# Patient Record
Sex: FEMALE | URBAN - METROPOLITAN AREA
[De-identification: names, ages, dates, MRNs, and addresses within clinical notes are randomized per-mention and may not be internally consistent; named-entity substitution may affect disease eponyms.]

---

## 2017-12-27 ENCOUNTER — TRANSFERRED RECORDS (OUTPATIENT)
Dept: HEALTH INFORMATION MANAGEMENT | Facility: CLINIC | Age: 82
End: 2017-12-27

## 2018-04-19 NOTE — TELEPHONE ENCOUNTER
FUTURE VISIT INFORMATION      FUTURE VISIT INFORMATION:    Date: 04/24/2018    Time: 10:00    Location: [facility]  REFERRAL INFORMATION:    Referring provider:  DR ISRAEL    Referring providers clinic:  [facility]    Reason for visit/diagnosis  chronic cough     RECORDS REQUESTED FROM:       Clinic name Comments Records Status Imaging Status   [facility] OFFICE VISIT: 08/22/2017-02/13/2018  IMAGE XR CHEST 08/22/2017  FLEXIBLE LARYNGOSCOPY 12/18/2017  XR VIDEO SWALLOW 12/27/2017 EXTERNAL YES IN PACS   CARE EVERYWHERE -[facility] 07/14/2016,07/13/2016,06/21/2016  ESOPHAGOGASTRODUODENOSCOPY WITH BIOPSY 06/30/2016 EXTERNAL                              RECORDS STATUS

## 2018-04-24 ENCOUNTER — OFFICE VISIT (OUTPATIENT)
Dept: OTOLARYNGOLOGY | Facility: CLINIC | Age: 83
End: 2018-04-24
Payer: COMMERCIAL

## 2018-04-24 ENCOUNTER — PRE VISIT (OUTPATIENT)
Dept: OTOLARYNGOLOGY | Facility: CLINIC | Age: 83
End: 2018-04-24

## 2018-04-24 VITALS — HEART RATE: 65 BPM | DIASTOLIC BLOOD PRESSURE: 80 MMHG | SYSTOLIC BLOOD PRESSURE: 121 MMHG | OXYGEN SATURATION: 97 %

## 2018-04-24 DIAGNOSIS — R05.9 COUGH: Primary | ICD-10-CM

## 2018-04-24 DIAGNOSIS — R49.0 DYSPHONIA: Primary | ICD-10-CM

## 2018-04-24 DIAGNOSIS — R05.3 CHRONIC COUGH: ICD-10-CM

## 2018-04-24 DIAGNOSIS — J38.3 VOCAL CORD BOWING: ICD-10-CM

## 2018-04-24 DIAGNOSIS — R49.0 DYSPHONIA: ICD-10-CM

## 2018-04-24 RX ORDER — ATORVASTATIN CALCIUM 10 MG/1
10 TABLET, FILM COATED ORAL
COMMUNITY
Start: 2018-01-02

## 2018-04-24 RX ORDER — ALBUTEROL SULFATE 0.83 MG/ML
2.5 SOLUTION RESPIRATORY (INHALATION)
COMMUNITY
Start: 2017-11-30

## 2018-04-24 RX ORDER — LOSARTAN POTASSIUM 50 MG/1
50 TABLET ORAL
COMMUNITY
Start: 2018-01-02

## 2018-04-24 RX ORDER — AZELASTINE 1 MG/ML
1 SPRAY, METERED NASAL
COMMUNITY
Start: 2018-03-01

## 2018-04-24 RX ORDER — MIRTAZAPINE 15 MG/1
15 TABLET, FILM COATED ORAL
COMMUNITY
Start: 2018-01-02

## 2018-04-24 RX ORDER — AMLODIPINE BESYLATE 5 MG/1
5 TABLET ORAL
COMMUNITY
Start: 2018-03-01

## 2018-04-24 RX ORDER — PREDNISONE 5 MG/1
5 TABLET ORAL
COMMUNITY
Start: 2018-04-06

## 2018-04-24 RX ORDER — ACETAMINOPHEN 500 MG
1000 TABLET ORAL
COMMUNITY

## 2018-04-24 RX ORDER — ASPIRIN 81 MG/1
81 TABLET, CHEWABLE ORAL
COMMUNITY
Start: 2018-01-02

## 2018-04-24 RX ORDER — METOPROLOL TARTRATE 100 MG
50 TABLET ORAL
COMMUNITY
Start: 2018-03-01

## 2018-04-24 RX ORDER — SENNOSIDES 8.6 MG
1300 CAPSULE ORAL
COMMUNITY
Start: 2017-12-08

## 2018-04-24 ASSESSMENT — PAIN SCALES - GENERAL: PAINLEVEL: NO PAIN (0)

## 2018-04-24 NOTE — PROGRESS NOTES
[facility]  Evaluation report    Clinician: Zachery Wolff M.M., M.A., CCC/SLP  Seen in conjunction with: Dr. Rudd  Referring physician:  Dr. Venegas  Patient: Angie Campos  Date of Visit: 4/24/2018    HISTORY  Chief complaint: Angie Campos is a 83 year old woman presenting today for evaluation of cough, breathing issues, and throat discomfort.    Onset: Cough began gradually approximately 15 years ago  Inciting incident: Unknown  Course: Stable  Salient history: She has a history significant for hemorrhagic stroke, tremor, and long-standing cough.  She has had an extensive workup in the past including chest CT, bronchoscopies, blood test for food allergens (positive only to milk protein), and most recently nasal endoscopy performed by Dr. Joshua Venegas at [facility].  When seen by Dr. Sky in 2016 he felt that the cough sounded as though it should be productive, but was ineffective, and felt that it could be part of an infectious process, though subsequent labs were indicated normal levels of IgE.  With Dr. 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     OTHER PERTINENT HISTORY    Otherwise unknown.  Please also refer to Dr. Rudd's dictation.     No past medical history on file.  No past surgical history on file.    OBJECTIVE  PATIENT REPORTED MEASURES  Patient Supplied Answers To VHI Questionnaire  Voice Handicap Index (VHI-10) 4/24/2018   My voice makes it difficult for people to hear me 2   People have difficulty understanding me in a noisy room 2   My voice difficulties restrict my personal and social life.  1   I feel left out of conversations because of my voice 0   My voice problem causes me to lose income 0   I feel as though I have to strain to produce voice 0   The clarity of my voice is unpredictable 2   My voice problem upsets me 0   My voice makes me feel handicapped 0   People ask, \"What's wrong with your voice?\" 0   VHI-10 7     Patient Supplied Answers To CSI Questionnaire  Cough Severity Index (CSI) 4/24/2018   My cough is worse when I lie down 0   My coughing problem causes me to restrict my personal and social life 2   I tend to avoid places because of my cough problem 2   I feel embarrassed because of my coughing problem 0   People ask, ''What's wrong?'' because I cough a lot 0   I run out of air when I cough 3   My coughing problem affects my voice 2   My coughing problem limits my physical activity 4   My coughing problem upsets me 3   People ask me if I am sick because I cough a lot 0   CSI Score 16     Patient Supplied Answers To EAT Questionnaire  Eating Assessment Tool (EAT-10) 4/24/2018   My swallowing problem has caused me to lose weight 0   My swallowing " problem interferes with my ability to go out for meals 0   Swallowing liquids takes extra effort 0   Swallowing solids takes extra effort 0   Swallowing pills takes extra effort 0   Swallowing is painful 0   The pleasure of eating is affected by my swallowing 0   When I swallow food sticks in my throat 0   I cough when I eat 2   Swallowing is stressful 0   EAT-10 2     PERCEPTUAL EVALUATION (CPT 35202)  POSTURE / TENSION:     neck and shoulders     Mild to moderate head and neck tremor    BREATHING:     shallow    phonation is not coordinated with respiration    LARYNGEAL PALPATION:     no significant tenderness in the thyrohyoid space or supra hyoid space    Significant tenderness caudal to the cricothyroid    VOICE:    Roughness: Mild to moderate Intermittent    Breathiness: Mild Consistent    Strain: Minimal    Vocal Tremor: Mild Intermittent    Loudness    Conversational speech:  Mild to moderately reduced    Pitch:    Conversational speech:  Mildly lowered    Pitch glide: Reduced range but essentially within functional limits and no notable breaks    Resonance:    Conversational speech: Intermittent laryngeal pharyngeal resonance associated with poor respiratory phonatory coordination    CAPE-V Overall Severity:  27/100    COUGH/THROAT CLEARING:    Occasional    Wet    Locus of cough/ throat clear: sounds consistent with lower airway    In keeping with the son's observations her cough sounds as if it has pulmonary origin and as if it should be productive    She is able to get strong glottic coup and clear strong cough    THERAPY PROBES: Improvement was elicited with coordination of respiration and phonation and use of glottic coup to promote vocal fold closure    LARYNGEAL EXAMINATION (04299)  Procedure: Flexible endoscopy with chip-tip technology without stroboscopy, right nostril; topical anesthesia with 3% Lidocaine and 0.25% phenylephrine was applied.   Performed by: Zachery Wolff M.M., M.A.,  CCC/SLP  Verbal consent was obtained and witnessed prior to this procedure.   This exam shows:    Laryngeal Mucosa: essentially healthy laryngeal mucosa    Secretions: Significant dryness of the laryngeal and supraglottic mucosa visualized thick secretions were seen below the level of the cricoid cartilage and the trachea and although the did move with cough the continued to be persistent    Vocal fold mucosa:    o Bilaterally mild concavity to the vibratory margins    Vocal fold function:   o Vocal folds are mobile and meet at midline  o Movement is brisk and symmetric  o Tremor is noted in some of the pharyngeal musculature however vocal tremor is only subtly appreciated    Airway is adequate    elongation of the vocal folds for pitch increase is normal    Glottic adduction: Spindle-shaped glottal gap during phonatory tasks; however, the patient was able to get adequate glottic closure for cough and airway protection    Mild to moderate ventricular approximation during phonation; ventricular folds appear to be a vibratory source damp vibration of the true vocal folds (R>L)    Therapy probes show improved glottic closure with glottic coup task and forward resonant stimuli    Stroboscopy was not warranted      Vocal cord bowing    The laryngeal exam was reviewed with Ms. Campos, and I provided pertinent explanations, as well as written and oral information.    ASSESSMENT / PLAN  IMPRESSIONS: Angie Campos is presenting today with R49.0 (Dysphonia) and R05 (Chronic Cough) in the context of and persistent and mucus both at the level of the larynx and supraglottis as well as visualized in the proximal trachea.  Although J38.3 (Vocal cord bowing) was noted on laryngeal exam, she demonstrated the ability to achieve adequate glottic closure for a crisp strong cough, and is not currently bothered by her voice quality. Based on these results it is felt that her cough, though irksome, is serving an important function of  expectorating thickened secretions from her proximal airway.      STIMULABILITY: results of therapy probes during perceptual and laryngeal evaluation demonstrate improvement of voice quality and glottic configuration with coordination of respiration and phonation and use of glottic coup to promote vocal fold closure    RECOMMENDATIONS:     Cough suppression therapy is not recommended at this point given positive function of cough.    Dr. Rudd has recommended that the patient re-connect with her PCP regarding measures to limit need to cough through management of thickened secretions.  Please refer to his note for full details.    Based on therapy probes the patient could benefit from voice therapy to address dysphonia associated with vocal cord bowing and poor respiratory phonatory coordination, but as the patient is not concerned with her voice quality at this time, this will not be pursued.    EVALUATION ONLY     G-Codes:   - Functional limitation, current status, at evalution CJ - At least 20 percent but less than 40 percent impaired, limited, or restricted   - Functional limitation, projected goal status, at discharge from therapy CJ - At least 20 percent but less than 40 percent impaired, limited, or restricted   - Functional limitation, discharge status, at discharge from therapy CJ - At least 20 percent but less than 40 percent impaired, limited, or restricted  Certification period: EVALUATION ONLY    This treatment plan was developed with the patient who agreed with the recommendations.    TOTAL SERVICE TIME: 60 minutes  EVALUATION OF VOICE AND RESONANCE: (76888): 30 minutes    ENDOSCOPIC LARYNGEAL EXAMINATION WITHOUT STROBOSCOPY (98717): 30 minutes  NO CHARGE FACILITY FEE (82277)    Zachery Wolff M.M., M.A., CCC-SLP  Speech-Language Pathologist  Certificate of Vocology  [phone]

## 2018-04-24 NOTE — NURSING NOTE
Chief Complaint   Patient presents with     Consult     Consultation for chronic cough      Blood pressure 121/80, pulse 65, SpO2 97 %.    Eddie Lee

## 2018-04-24 NOTE — LETTER
4/24/2018       RE: Angie Campos  [address] [facility] [address]     Dear Colleague,    Thank you for referring your patient, Angie Campos, to the [facility] VOICE at [facility]. Please see a copy of my visit note below.    [facility]ICE [facility]IN[facility]  Evaluation report    Clinician: Zachery Wolff M.M., M.A., CCC/SLP  Seen in conjunction with: Dr. Rudd  Referring physician:  Dr. Venegas  Patient: Angie Campos  Date of Visit: 4/24/2018    HISTORY  Chief complaint: Angie Campos is a 83 year old woman presenting today for evaluation of cough, breathing issues, and throat discomfort.    Onset: Cough began gradually approximately 15 years ago  Inciting incident: Unknown  Course: Stable  Salient history: She has a history significant for hemorrhagic stroke, tremor, and long-standing cough.  She has had an extensive workup in the past including chest CT, bronchoscopies, blood test for food allergens (positive only to milk protein), and most recently nasal endoscopy performed by Dr. Joshua Venegas at [facility].  When seen by Dr. Sky in 2016 he felt that the cough sounded as though it should be productive, but was ineffective, and felt that it could be part of an infectious process, though subsequent labs were indicated normal levels of IgE.  With Dr. 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     OTHER PERTINENT HISTORY    Otherwise unknown.  Please also refer to Dr. Rudd's dictation.     No past medical history on file.  No past surgical history on file.    OBJECTIVE  PATIENT REPORTED MEASURES  Patient Supplied Answers To VHI Questionnaire  Voice Handicap Index (VHI-10) 4/24/2018   My voice makes it difficult for people to hear me 2   People have difficulty understanding me in a noisy room 2   My voice difficulties restrict my personal and social life.  1   I feel left out of conversations because of my voice 0   My voice problem causes me to lose income 0   I feel as though I have to strain to produce voice 0   The clarity of my voice is unpredictable 2   My voice problem upsets me 0   My voice makes me feel handicapped 0   People ask, \"What's wrong with your voice?\" 0   VHI-10 7     Patient Supplied Answers To CSI Questionnaire  Cough Severity Index (CSI) 4/24/2018   My cough is worse when I lie down 0   My coughing problem causes me to restrict my personal and social life 2   I tend to avoid places because of my cough problem 2   I feel embarrassed because of my coughing problem 0   People ask, ''What's wrong?'' because I cough a lot 0   I run out of air when I cough 3   My coughing problem affects my voice 2   My coughing problem limits my physical activity 4   My " coughing problem upsets me 3   People ask me if I am sick because I cough a lot 0   CSI Score 16     Patient Supplied Answers To EAT Questionnaire  Eating Assessment Tool (EAT-10) 4/24/2018   My swallowing problem has caused me to lose weight 0   My swallowing problem interferes with my ability to go out for meals 0   Swallowing liquids takes extra effort 0   Swallowing solids takes extra effort 0   Swallowing pills takes extra effort 0   Swallowing is painful 0   The pleasure of eating is affected by my swallowing 0   When I swallow food sticks in my throat 0   I cough when I eat 2   Swallowing is stressful 0   EAT-10 2     PERCEPTUAL EVALUATION (CPT 92074)  POSTURE / TENSION:     neck and shoulders     Mild to moderate head and neck tremor    BREATHING:     shallow    phonation is not coordinated with respiration    LARYNGEAL PALPATION:     no significant tenderness in the thyrohyoid space or supra hyoid space    Significant tenderness caudal to the cricothyroid    VOICE:    Roughness: Mild to moderate Intermittent    Breathiness: Mild Consistent    Strain: Minimal    Vocal Tremor: Mild Intermittent    Loudness    Conversational speech:  Mild to moderately reduced    Pitch:    Conversational speech:  Mildly lowered    Pitch glide: Reduced range but essentially within functional limits and no notable breaks    Resonance:    Conversational speech: Intermittent laryngeal pharyngeal resonance associated with poor respiratory phonatory coordination    CAPE-V Overall Severity:  27/100    COUGH/THROAT CLEARING:    Occasional    Wet    Locus of cough/ throat clear: sounds consistent with lower airway    In keeping with the son's observations her cough sounds as if it has pulmonary origin and as if it should be productive    She is able to get strong glottic coup and clear strong cough    THERAPY PROBES: Improvement was elicited with coordination of respiration and phonation and use of glottic coup to promote vocal fold  closure    LARYNGEAL EXAMINATION (23805)  Procedure: Flexible endoscopy with chip-tip technology without stroboscopy, right nostril; topical anesthesia with 3% Lidocaine and 0.25% phenylephrine was applied.   Performed by: Zachery Wolff M.M. M.A., CCC/SLP  Verbal consent was obtained and witnessed prior to this procedure.   This exam shows:    Laryngeal Mucosa: essentially healthy laryngeal mucosa    Secretions: Significant dryness of the laryngeal and supraglottic mucosa visualized thick secretions were seen below the level of the cricoid cartilage and the trachea and although the did move with cough the continued to be persistent    Vocal fold mucosa:    o Bilaterally mild concavity to the vibratory margins    Vocal fold function:   o Vocal folds are mobile and meet at midline  o Movement is brisk and symmetric  o Tremor is noted in some of the pharyngeal musculature however vocal tremor is only subtly appreciated    Airway is adequate    elongation of the vocal folds for pitch increase is normal    Glottic adduction: Spindle-shaped glottal gap during phonatory tasks; however, the patient was able to get adequate glottic closure for cough and airway protection    Mild to moderate ventricular approximation during phonation; ventricular folds appear to be a vibratory source damp vibration of the true vocal folds (R>L)    Therapy probes show improved glottic closure with glottic coup task and forward resonant stimuli    Stroboscopy was not warranted      Vocal cord bowing    The laryngeal exam was reviewed with Ms. Campos, and I provided pertinent explanations, as well as written and oral information.    ASSESSMENT / PLAN  IMPRESSIONS: Angie Andres is presenting today with R49.0 (Dysphonia) and R05 (Chronic Cough) in the context of and persistent and mucus both at the level of the larynx and supraglottis as well as visualized in the proximal trachea.  Although J38.3 (Vocal cord bowing) was noted on laryngeal  exam, she demonstrated the ability to achieve adequate glottic closure for a crisp strong cough, and is not currently bothered by her voice quality. Based on these results it is felt that her cough, though irksome, is serving an important function of expectorating thickened secretions from her proximal airway.      STIMULABILITY: results of therapy probes during perceptual and laryngeal evaluation demonstrate improvement of voice quality and glottic configuration with coordination of respiration and phonation and use of glottic coup to promote vocal fold closure    RECOMMENDATIONS:     Cough suppression therapy is not recommended at this point given positive function of cough.    Dr. Rudd has recommended that the patient re-connect with her PCP regarding measures to limit need to cough through management of thickened secretions.  Please refer to his note for full details.    Based on therapy probes the patient could benefit from voice therapy to address dysphonia associated with vocal cord bowing and poor respiratory phonatory coordination, but as the patient is not concerned with her voice quality at this time, this will not be pursued.    EVALUATION ONLY     G-Codes:   - Functional limitation, current status, at evalution CJ - At least 20 percent but less than 40 percent impaired, limited, or restricted   - Functional limitation, projected goal status, at discharge from therapy CJ - At least 20 percent but less than 40 percent impaired, limited, or restricted   - Functional limitation, discharge status, at discharge from therapy CJ - At least 20 percent but less than 40 percent impaired, limited, or restricted  Certification period: EVALUATION ONLY    This treatment plan was developed with the patient who agreed with the recommendations.    TOTAL SERVICE TIME: 60 minutes  EVALUATION OF VOICE AND RESONANCE: (59081): 30 minutes    ENDOSCOPIC LARYNGEAL EXAMINATION WITHOUT STROBOSCOPY (02017): 30  minutes  NO CHARGE FACILITY FEE (82612)    Zachery Wolff M.M., M.A., CCC-SLP  Speech-Language Pathologist  Certificate of Vocology  [phone]

## 2018-04-24 NOTE — MR AVS SNAPSHOT
After Visit Summary   4/24/2018    Angie Campos    MRN: [id number]           Patient Information     Date Of Birth          12/21/1934        Visit Information        Provider Department      4/24/2018 10:00 AM Peter Rudd MD [facility] Ear Nose [facility] Thro[facility]        Today's Diagnoses     Cough    -  1    Dysphonia           Follow-ups after your visit        Additional Services     [facility] REFERRAL       SPEECH-LANGUAGE PATHOLOGY SERVICE(S) REQUESTED:  Evaluate and treat  Laryngoscopy; add Stroboscopy as needed for assessment of vibratory characteristics of vocal fold mucosa in evaluation of dysphonia; add Flexible Endoscopic Evaluation of Swallowing as needed for evaluation of swallow when dysphagia is suspected; if patient is unable to tolerate laryngoscopy, may use 3% lidocaine/0.25% phenylephrine or 20% oral anesthetic benzocaine as needed, but not for evaluation of swallow.    Kenya Luong, Ph.D., CCC/SLP  Speech-Language Pathologist  Director, [facility]  [phone]    Shireen Chisholm M.M., M.A., CCC/SLP  Speech-Language Pathologist  [facility]  [phone]    Allison Alpers, M.A., CCC/SLP  Speech-Language Pathologist  [facility]    Zachery Wolff M.M., M.A., CCC/SLP  Speech-Language Pathologist  [facility]                  Follow-up notes from your care team     Return if symptoms worsen or fail to improve.      Who to contact     Please call your clinic at [phone] to:    Ask questions about your health    Make or cancel appointments    Discuss your medicines    Learn about your test results    Speak to your doctor            Additional Information About Your Visit        [employer] Information     [employer] is an electronic gateway that provides easy, online access to your medical records. With [employer], you can request a clinic appointment, read your test results, renew a prescription or communicate with your care team.     To  sign up for [employer] visit the website at [email or web address]   You will be asked to enter the access code listed below, as well as some personal information. Please follow the directions to create your username and password.     Your access code is: [email or web address]  Expires: 2018  6:31 AM     Your access code will  in 90 days. If you need help or a new code, please contact your [facility] or call [phone] for assistance.        Care EveryWhere ID     This is your Care EveryWhere ID. This could be used by other organizations to access your [place] medical records  [id number]        Your Vitals Were     Pulse Pulse Oximetry                65 97%           Blood Pressure from Last 3 Encounters:   18 121/80   16 125/86    Weight from Last 3 Encounters:   No data found for Wt              We Performed the Following     IMAGESTREAM RECORDING ORDER     [facility] REFERRAL        Primary Care Provider Office Phone # Fax #    Joshua IVEY Bernard [phone]       [facility] [address] [address]        Equal Access to Services     [facility]: Hadii aad ku hadasho Soomaali, waaxda luqadaha, qaybta kaalmada adeegyada, isa limon . So [facility] [phone].    ATENCIÓN: Si habla español, tiene a coronel disposición servicios gratuitos de asistencia lingüística. [facility] [phone].    We comply with applicable federal civil rights laws and [place] laws. We do not discriminate on the basis of race, color, national origin, age, disability, sex, sexual orientation, or gender identity.            Thank you!     Thank you for choosing [facility] EAR NOSE [facility] TH[facility]AT  for your care. Our goal is always to provide you with excellent care. Hearing back from our patients is one way we can continue to improve our services. Please take a few minutes to complete the written survey that you may receive in the mail  after your visit with us. Thank you!             Your Updated Medication List - Protect others around you: Learn how to safely use, store and throw away your medicines at www.disposemymeds.org.          This list is accurate as of 4/24/18 11:59 PM.  Always use your most recent med list.                   Brand Name Dispense Instructions for use Diagnosis    * acetaminophen 500 MG tablet    TYLENOL     Take 1,000 mg by mouth        * acetaminophen 650 MG CR tablet    TYLENOL     Take 1,300 mg by mouth        albuterol (2.5 MG/3ML) 0.083% neb solution      Inhale 2.5 mg into the lungs        amLODIPine 5 MG tablet    NORVASC     Take 5 mg by mouth        aspirin 81 MG chewable tablet      Take 81 mg by mouth        atorvastatin 10 MG tablet    LIPITOR     Take 10 mg by mouth        azelastine 0.1 % spray    ASTELIN     Spray 1 spray in nostril        azithromycin 250 MG tablet    ZITHROMAX    6 tablet    Take 1 tablet (250 mg) by mouth daily    Cough       Calcium Carb-Cholecalciferol 600-800 MG-UNIT Chew      Take 1 tablet by mouth        CHEST RUB EX      Apply two drops to each nostril BID X 2 month supply        FLUoxetine 10 MG capsule    PROzac     Take 20 mg by mouth        guaiFENesin-dextromethorphan 100-10 MG/5ML syrup    ROBITUSSIN DM     Take 10 mLs by mouth        losartan 50 MG tablet    COZAAR     Take 50 mg by mouth        Menthol (Topical Analgesic) 6 % Gel           Menthol-Camphor 10-11 % Crea           metoprolol tartrate 100 MG tablet    LOPRESSOR     Take 50 mg by mouth        mirtazapine 15 MG tablet    REMERON     Take 15 mg by mouth        predniSONE 5 MG tablet    DELTASONE     Take 5 mg by mouth        TGT PSYLLIUM FIBER 0.52 g capsule   Generic drug:  psyllium      Take 1 capsule by mouth        TOBRADEX 0.3-0.1 % ophthalmic susp   Generic drug:  tobramycin-dexamethasone      Two drops to each ear BID X 14 days supply        TUMS 500 MG chewable tablet   Generic drug:  calcium carbonate       Take 500 mg by mouth        * Notice:  This list has 2 medication(s) that are the same as other medications prescribed for you. Read the directions carefully, and ask your doctor or other care provider to review them with you.

## 2018-04-24 NOTE — PROGRESS NOTES
HISTORY OF PRESENT ILLNESS: Angie Campos is a 83 year old female with a history of chronic cough.  This been going on for over 15 years.  She has had multiple workups in the past including pulmonary and cardiac.  She notes she did have an intracranial hemorrhage in 2015 she feels that she has gotten weaker over the last few years.  She was recently seen by Dr. Venegas noted that she had sometimes when she had breath-holding as well as cough and noted that she had vocal fold bowing.  This could be weakening her vocal quality.  She does have a head and vocal tremor.She feels that she does have irritation a dry throat and mucus in her throat.  She rarely has gastroesophageal reflux.  In general she feels the cough is stayed the same but admits that she has gotten weaker during this period of time.    Last 2 Scores for Patient-Answered VHI Questionnaire  VHI Total Score 4/24/2018   VHI Total Score 7       Last 2 Scores for Patient-Answered CSI Questionnaire  CSI Total Score 4/24/2018   CSI Total Score 10         Last 2 Scores for Patient-Answered EAT Questionnaire  EAT Total Score 4/24/2018   EAT Total Score 2           PAST MEDICAL HISTORY:   Past Medical History:   Diagnosis Date     Hoarseness      Tinnitus        PAST SURGICAL HISTORY: No past surgical history on file.    FAMILY HISTORY:   Family History   Problem Relation Age of Onset     HEART DISEASE Mother      HEART DISEASE Father      HEART DISEASE Sister      HEART DISEASE Sister      HEART DISEASE Sister      HEART DISEASE Brother      HEART DISEASE Brother        SOCIAL HISTORY:   Social History   Substance Use Topics     Smoking status: Never Smoker     Smokeless tobacco: Never Used     Alcohol use No       REVIEW OF SYSTEMS: Ten point review of systems was performed and is negative except for:   [facility] ROS 4/24/2018   Ears, Nose, Throat Hearing loss, Ear pain   Cardiopulmonary Cough, Breathing problems   Musculoskeletal Back pain, Neck pain    Allergy/Immunology Allergies or hay fever   Hematologic Easy bruising        ALLERGIES: Levofloxacin; Codeine; Diphenhydramine; Hydrocodone; Hydromorphone; Lac bovis; Lisinopril-hydrochlorothiazide; and Morphine    MEDICATIONS:   Current Outpatient Prescriptions   Medication Sig Dispense Refill     acetaminophen (TYLENOL) 500 MG tablet Take 1,000 mg by mouth       acetaminophen (TYLENOL) 650 MG CR tablet Take 1,300 mg by mouth       albuterol (2.5 MG/3ML) 0.083% neb solution Inhale 2.5 mg into the lungs       amLODIPine (NORVASC) 5 MG tablet Take 5 mg by mouth       aspirin 81 MG chewable tablet Take 81 mg by mouth       atorvastatin (LIPITOR) 10 MG tablet Take 10 mg by mouth       azelastine (ASTELIN) 0.1 % spray Spray 1 spray in nostril       Calcium Carb-Cholecalciferol 600-800 MG-UNIT CHEW Take 1 tablet by mouth       calcium carbonate (TUMS) 500 MG chewable tablet Take 500 mg by mouth       Camphor-Eucalyptus-Menthol (CHEST RUB EX) Apply two drops to each nostril BID X 2 month supply       FLUoxetine (PROZAC) 10 MG capsule Take 20 mg by mouth       guaiFENesin-dextromethorphan (ROBITUSSIN DM) 100-10 MG/5ML syrup Take 10 mLs by mouth       losartan (COZAAR) 50 MG tablet Take 50 mg by mouth       Menthol, Topical Analgesic, 6 % GEL        Menthol-Camphor 10-11 % CREA        metoprolol tartrate (LOPRESSOR) 100 MG tablet Take 50 mg by mouth       mirtazapine (REMERON) 15 MG tablet Take 15 mg by mouth       predniSONE (DELTASONE) 5 MG tablet Take 5 mg by mouth       psyllium (TGT PSYLLIUM FIBER) 0.52 G capsule Take 1 capsule by mouth       tobramycin-dexamethasone (TOBRADEX) ophthalmic suspension Two drops to each ear BID X 14 days supply       azithromycin (ZITHROMAX) 250 MG tablet Take 1 tablet (250 mg) by mouth daily (Patient not taking: Reported on 4/24/2018) 6 tablet 0     [DISCONTINUED] amLODIPine (NORVASC) 10 MG tablet Take 10 mg by mouth       [DISCONTINUED] atorvastatin (LIPITOR) 10 MG tablet Take 10 mg  by mouth       [DISCONTINUED] losartan (COZAAR) 50 MG tablet Take 50 mg by mouth       [DISCONTINUED] metoprolol (LOPRESSOR) 100 MG tablet            PHYSICAL EXAMINATION:  She  is awake, alert and in no apparent distress.  She has a resting head tremor her tympanic membranes are clear and intact bilaterally. External auditory canals are clear.  Nasal exam shows a mild septal deviation without obstruction.  Examination of the oral cavity shows no suspicious lesions.  The oral mucosa is moderately dry.  There is symmetric movement of the tongue and soft palate.    The oropharynx is clear.  Her neck is supple without significant adenopathy.  Pulse is regular.  Upper airway is clear.  Cranial nerves II-XII are grossly intact.       PROCEDURE: A flexible laryngoscopy  was performed by our speech-language pathologist and reviewed by myself.  Exam showed the vocal to be mobile and meet in the midline no nodules polyps ulcerations are seen.  There is minimal to no supraglottic inflammation.  With phonation the vocal folds are mildly bowed and there is hyperfunction of the false vocal folds.  She has a very strong cough.  During today's examination a moderately thickened collection of secretions in the upper trachea and subglottic area was present.  She was able with difficulty to cough this clear.  The described this as the nature of her coughing.  The remainder the immediate subglottic area is clear as is the hypopharynx.  It was noted that the secretions were mildly thickened as well.    Quiet respiration. Small amount of mucous in subglottis        Phonation with mild vocal fold bowing        IMPRESSION/PLAN: A chronic cough that is involved in clearing tracheal secretions.  The secretions are mildly thick and possibly secondary to her medications.  She has gotten weaker over the years was harder to clear her tracheal secretions.  I reviewed with them that I think her cough is important for her to keep her trachea clean  and clear.  I am recommending that they review with her primary care doctor if any of these medications can be altered or additional medications to help thin secretions either by mouth or by nebulizer to see if they can help her clear her secretions. I advised her that most of her medications probably can't be changed.  At this point I do not recommend suppressing the cough as this is important to keep her upper trachea clear.  All questions were answered I will have her follow-up on an as-needed basis.

## 2018-04-24 NOTE — MR AVS SNAPSHOT
After Visit Summary   2018    Angie Campos    MRN: [id number]           Patient Information     Date Of Birth          1934        Visit Information        Provider Department      2018 10:00 AM Jose Manuel Wolff, RONALDO  [facility]        Today's Diagnoses     Dysphonia    -  1    Chronic cough        Vocal cord bowing           Follow-ups after your visit        Who to contact     Please call your clinic at [phone] to:    Ask questions about your health    Make or cancel appointments    Discuss your medicines    Learn about your test results    Speak to your doctor            Additional Information About Your Visit        MyChart Information     [employer] is an electronic gateway that provides easy, online access to your medical records. With [employer], you can request a clinic appointment, read your test results, renew a prescription or communicate with your care team.     To sign up for [employer] visit the website at [email or web address]   You will be asked to enter the access code listed below, as well as some personal information. Please follow the directions to create your username and password.     Your access code is: [email or web address]  Expires: 2018  6:31 AM     Your access code will  in 90 days. If you need help or a new code, please contact your [facility] or call [phone] for assistance.        Care EveryWhere ID     This is your Care EveryWhere ID. This could be used by other organizations to access your [place] medical records  [id number]         Blood Pressure from Last 3 Encounters:   18 121/80   16 125/86    Weight from Last 3 Encounters:   No data found for Wt              We Performed the Following     C BEHAVIORAL & QUALITATIVE ANALYSIS VOICE AND RESONANCE     HC  SLP VOICE CURRENT STATUS     HC SLP VOICE D/C STATUS     HC SLP VOICE GOAL STATUS     LARYNGOSCOPY FLEX FIBEROPTIC, DIAGNOSTIC        Primary  Care Provider Office Phone # Fax #    Joshua Wagner [phone]       [facility] [address] [address]        Equal Access to Services     EVARISTO ARAGON : Hadlara marcos archer kacieo Sosamantha, wacarolynda luqadaha, qaybta kaalmada vivek, isa loyasanjiv rosario. So [facility] [phone].    ATENCIÓN: Si habla español, tiene a coronel disposición servicios gratuitos de asistencia lingüística. Llame al [phone].    We comply with applicable federal civil rights laws and [place] laws. We do not discriminate on the basis of race, color, national origin, age, disability, sex, sexual orientation, or gender identity.            Thank you!     Thank you for choosing [facility]  for your care. Our goal is always to provide you with excellent care. Hearing back from our patients is one way we can continue to improve our services. Please take a few minutes to complete the written survey that you may receive in the mail after your visit with us. Thank you!             Your Updated Medication List - Protect others around you: Learn how to safely use, store and throw away your medicines at www.disposemymeds.org.          This list is accurate as of 4/24/18  4:25 PM.  Always use your most recent med list.                   Brand Name Dispense Instructions for use Diagnosis    * acetaminophen 500 MG tablet    TYLENOL     Take 1,000 mg by mouth        * acetaminophen 650 MG CR tablet    TYLENOL     Take 1,300 mg by mouth        albuterol (2.5 MG/3ML) 0.083% neb solution      Inhale 2.5 mg into the lungs        amLODIPine 5 MG tablet    NORVASC     Take 5 mg by mouth        aspirin 81 MG chewable tablet      Take 81 mg by mouth        atorvastatin 10 MG tablet    LIPITOR     Take 10 mg by mouth        azelastine 0.1 % spray    ASTELIN     Spray 1 spray in nostril        azithromycin 250 MG tablet    ZITHROMAX    6 tablet    Take 1 tablet (250 mg) by mouth daily    Cough       Calcium  Carb-Cholecalciferol 600-800 MG-UNIT Chew      Take 1 tablet by mouth        CHEST RUB EX      Apply two drops to each nostril BID X 2 month supply        FLUoxetine 10 MG capsule    PROzac     Take 20 mg by mouth        guaiFENesin-dextromethorphan 100-10 MG/5ML syrup    ROBITUSSIN DM     Take 10 mLs by mouth        losartan 50 MG tablet    COZAAR     Take 50 mg by mouth        Menthol (Topical Analgesic) 6 % Gel           Menthol-Camphor 10-11 % Crea           metoprolol tartrate 100 MG tablet    LOPRESSOR     Take 50 mg by mouth        mirtazapine 15 MG tablet    REMERON     Take 15 mg by mouth        predniSONE 5 MG tablet    DELTASONE     Take 5 mg by mouth        TGT PSYLLIUM FIBER 0.52 g capsule   Generic drug:  psyllium      Take 1 capsule by mouth        TOBRADEX 0.3-0.1 % ophthalmic susp   Generic drug:  tobramycin-dexamethasone      Two drops to each ear BID X 14 days supply        TUMS 500 MG chewable tablet   Generic drug:  calcium carbonate      Take 500 mg by mouth        * Notice:  This list has 2 medication(s) that are the same as other medications prescribed for you. Read the directions carefully, and ask your doctor or other care provider to review them with you.

## 2018-04-24 NOTE — LETTER
4/24/2018       RE: Angie Campos  [address] [facility] [id number]     Dear Colleague,    Thank you for referring your patient, Angie Campos, to the [facility] EAR NOSE AND THROAT at [facility]. Please see a copy of my visit note below.      HISTORY OF PRESENT ILLNESS: Angie Campos is a 83 year old female with a history of chronic cough.  This been going on for over 15 years.  She has had multiple workups in the past including pulmonary and cardiac.  She notes she did have an intracranial hemorrhage in 2015 she feels that she has gotten weaker over the last few years.  She was recently seen by Dr. Venegas noted that she had sometimes when she had breath-holding as well as cough and noted that she had vocal fold bowing.  This could be weakening her vocal quality.  She does have a head and vocal tremor.She feels that she does have irritation a dry throat and mucus in her throat.  She rarely has gastroesophageal reflux.  In general she feels the cough is stayed the same but admits that she has gotten weaker during this period of time.    Last 2 Scores for Patient-Answered VHI Questionnaire  VHI Total Score 4/24/2018   VHI Total Score 7       Last 2 Scores for Patient-Answered CSI Questionnaire  CSI Total Score 4/24/2018   CSI Total Score 10         Last 2 Scores for Patient-Answered EAT Questionnaire  EAT Total Score 4/24/2018   EAT Total Score 2           PAST MEDICAL HISTORY:   Past Medical History:   Diagnosis Date     Hoarseness      Tinnitus        PAST SURGICAL HISTORY: No past surgical history on file.    FAMILY HISTORY:   Family History   Problem Relation Age of Onset     HEART DISEASE Mother      HEART DISEASE Father      HEART DISEASE Sister      HEART DISEASE Sister      HEART DISEASE Sister      HEART DISEASE Brother      HEART DISEASE Brother        SOCIAL HISTORY:   Social History   Substance Use Topics     Smoking status: Never Smoker     Smokeless tobacco:  Never Used     Alcohol use No       REVIEW OF SYSTEMS: Ten point review of systems was performed and is negative except for:   [facility] ROS 4/24/2018   Ears, Nose, Throat Hearing loss, Ear pain   Cardiopulmonary Cough, Breathing problems   Musculoskeletal Back pain, Neck pain   Allergy/Immunology Allergies or hay fever   Hematologic Easy bruising        ALLERGIES: Levofloxacin; Codeine; Diphenhydramine; Hydrocodone; Hydromorphone; Lac bovis; Lisinopril-hydrochlorothiazide; and Morphine    MEDICATIONS:   Current Outpatient Prescriptions   Medication Sig Dispense Refill     acetaminophen (TYLENOL) 500 MG tablet Take 1,000 mg by mouth       acetaminophen (TYLENOL) 650 MG CR tablet Take 1,300 mg by mouth       albuterol (2.5 MG/3ML) 0.083% neb solution Inhale 2.5 mg into the lungs       amLODIPine (NORVASC) 5 MG tablet Take 5 mg by mouth       aspirin 81 MG chewable tablet Take 81 mg by mouth       atorvastatin (LIPITOR) 10 MG tablet Take 10 mg by mouth       azelastine (ASTELIN) 0.1 % spray Spray 1 spray in nostril       Calcium Carb-Cholecalciferol 600-800 MG-UNIT CHEW Take 1 tablet by mouth       calcium carbonate (TUMS) 500 MG chewable tablet Take 500 mg by mouth       Camphor-Eucalyptus-Menthol (CHEST RUB EX) Apply two drops to each nostril BID X 2 month supply       FLUoxetine (PROZAC) 10 MG capsule Take 20 mg by mouth       guaiFENesin-dextromethorphan (ROBITUSSIN DM) 100-10 MG/5ML syrup Take 10 mLs by mouth       losartan (COZAAR) 50 MG tablet Take 50 mg by mouth       Menthol, Topical Analgesic, 6 % GEL        Menthol-Camphor 10-11 % CREA        metoprolol tartrate (LOPRESSOR) 100 MG tablet Take 50 mg by mouth       mirtazapine (REMERON) 15 MG tablet Take 15 mg by mouth       predniSONE (DELTASONE) 5 MG tablet Take 5 mg by mouth       psyllium (TGT PSYLLIUM FIBER) 0.52 G capsule Take 1 capsule by mouth       tobramycin-dexamethasone (TOBRADEX) ophthalmic suspension Two drops to each ear BID X 14 days supply        azithromycin (ZITHROMAX) 250 MG tablet Take 1 tablet (250 mg) by mouth daily (Patient not taking: Reported on 4/24/2018) 6 tablet 0     [DISCONTINUED] amLODIPine (NORVASC) 10 MG tablet Take 10 mg by mouth       [DISCONTINUED] atorvastatin (LIPITOR) 10 MG tablet Take 10 mg by mouth       [DISCONTINUED] losartan (COZAAR) 50 MG tablet Take 50 mg by mouth       [DISCONTINUED] metoprolol (LOPRESSOR) 100 MG tablet            PHYSICAL EXAMINATION:  She  is awake, alert and in no apparent distress.  She has a resting head tremor her tympanic membranes are clear and intact bilaterally. External auditory canals are clear.  Nasal exam shows a mild septal deviation without obstruction.  Examination of the oral cavity shows no suspicious lesions.  The oral mucosa is moderately dry.  There is symmetric movement of the tongue and soft palate.    The oropharynx is clear.  Her neck is supple without significant adenopathy.  Pulse is regular.  Upper airway is clear.  Cranial nerves II-XII are grossly intact.       PROCEDURE: A flexible laryngoscopy  was performed by our speech-language pathologist and reviewed by myself.  Exam showed the vocal to be mobile and meet in the midline no nodules polyps ulcerations are seen.  There is minimal to no supraglottic inflammation.  With phonation the vocal folds are mildly bowed and there is hyperfunction of the false vocal folds.  She has a very strong cough.  During today's examination a moderately thickened collection of secretions in the upper trachea and subglottic area was present.  She was able with difficulty to cough this clear.  The described this as the nature of her coughing.  The remainder the immediate subglottic area is clear as is the hypopharynx.  It was noted that the secretions were mildly thickened as well.    Quiet respiration. Small amount of mucous in subglottis        Phonation with mild vocal fold bowing        IMPRESSION/PLAN: A chronic cough that is involved in  clearing tracheal secretions.  The secretions are mildly thick and possibly secondary to her medications.  She has gotten weaker over the years was harder to clear her tracheal secretions.  I reviewed with them that I think her cough is important for her to keep her trachea clean and clear.  I am recommending that they review with her primary care doctor if any of these medications can be altered or additional medications to help thin secretions either by mouth or by nebulizer to see if they can help her clear her secretions. I advised her that most of her medications probably can't be changed.  At this point I do not recommend suppressing the cough as this is important to keep her upper trachea clear.  All questions were answered I will have her follow-up on an as-needed basis.      Again, thank you for allowing me to participate in the care of your patient.      Sincerely,    Peter Rudd MD